# Patient Record
Sex: FEMALE | Race: WHITE | NOT HISPANIC OR LATINO | URBAN - METROPOLITAN AREA
[De-identification: names, ages, dates, MRNs, and addresses within clinical notes are randomized per-mention and may not be internally consistent; named-entity substitution may affect disease eponyms.]

---

## 2024-07-04 ENCOUNTER — INPATIENT (INPATIENT)
Facility: HOSPITAL | Age: 89
LOS: 4 days | Discharge: NURSING, REHAB OR LONG-TERM CARE | End: 2024-07-09
Attending: INTERNAL MEDICINE | Admitting: INTERNAL MEDICINE
Payer: MEDICARE

## 2024-07-04 VITALS
HEIGHT: 62 IN | HEART RATE: 78 BPM | TEMPERATURE: 101 F | DIASTOLIC BLOOD PRESSURE: 50 MMHG | OXYGEN SATURATION: 97 % | SYSTOLIC BLOOD PRESSURE: 143 MMHG | RESPIRATION RATE: 17 BRPM | WEIGHT: 115.08 LBS

## 2024-07-04 LAB
ALBUMIN SERPL ELPH-MCNC: 3.7 G/DL — SIGNIFICANT CHANGE UP (ref 3.3–5)
ALP SERPL-CCNC: 63 U/L — SIGNIFICANT CHANGE UP (ref 40–120)
ALT FLD-CCNC: 25 U/L — SIGNIFICANT CHANGE UP (ref 12–78)
ANION GAP SERPL CALC-SCNC: 8 MMOL/L — SIGNIFICANT CHANGE UP (ref 5–17)
APPEARANCE UR: CLEAR — SIGNIFICANT CHANGE UP
APTT BLD: 26.4 SEC — SIGNIFICANT CHANGE UP (ref 24.5–35.6)
AST SERPL-CCNC: 49 U/L — HIGH (ref 15–37)
BACTERIA # UR AUTO: ABNORMAL /HPF
BASOPHILS # BLD AUTO: 0.04 K/UL — SIGNIFICANT CHANGE UP (ref 0–0.2)
BASOPHILS NFR BLD AUTO: 0.3 % — SIGNIFICANT CHANGE UP (ref 0–2)
BILIRUB SERPL-MCNC: 1.4 MG/DL — HIGH (ref 0.2–1.2)
BILIRUB UR-MCNC: NEGATIVE — SIGNIFICANT CHANGE UP
BUN SERPL-MCNC: 20 MG/DL — SIGNIFICANT CHANGE UP (ref 7–23)
CALCIUM SERPL-MCNC: 10.3 MG/DL — HIGH (ref 8.5–10.1)
CHLORIDE SERPL-SCNC: 101 MMOL/L — SIGNIFICANT CHANGE UP (ref 96–108)
CK SERPL-CCNC: 654 U/L — HIGH (ref 26–192)
CO2 SERPL-SCNC: 28 MMOL/L — SIGNIFICANT CHANGE UP (ref 22–31)
COLOR SPEC: YELLOW — SIGNIFICANT CHANGE UP
CREAT SERPL-MCNC: 1.1 MG/DL — SIGNIFICANT CHANGE UP (ref 0.5–1.3)
DIFF PNL FLD: ABNORMAL
EGFR: 47 ML/MIN/1.73M2 — LOW
EOSINOPHIL # BLD AUTO: 0.06 K/UL — SIGNIFICANT CHANGE UP (ref 0–0.5)
EOSINOPHIL NFR BLD AUTO: 0.4 % — SIGNIFICANT CHANGE UP (ref 0–6)
EPI CELLS # UR: PRESENT
FLUAV AG NPH QL: SIGNIFICANT CHANGE UP
FLUBV AG NPH QL: SIGNIFICANT CHANGE UP
GLUCOSE SERPL-MCNC: 148 MG/DL — HIGH (ref 70–99)
GLUCOSE UR QL: NEGATIVE MG/DL — SIGNIFICANT CHANGE UP
HCT VFR BLD CALC: 38.9 % — SIGNIFICANT CHANGE UP (ref 34.5–45)
HGB BLD-MCNC: 13.1 G/DL — SIGNIFICANT CHANGE UP (ref 11.5–15.5)
IMM GRANULOCYTES NFR BLD AUTO: 0.4 % — SIGNIFICANT CHANGE UP (ref 0–0.9)
INR BLD: 0.97 RATIO — SIGNIFICANT CHANGE UP (ref 0.85–1.18)
KETONES UR-MCNC: ABNORMAL MG/DL
LACTATE SERPL-SCNC: 1.2 MMOL/L — SIGNIFICANT CHANGE UP (ref 0.7–2)
LEUKOCYTE ESTERASE UR-ACNC: ABNORMAL
LYMPHOCYTES # BLD AUTO: 1.07 K/UL — SIGNIFICANT CHANGE UP (ref 1–3.3)
LYMPHOCYTES # BLD AUTO: 8 % — LOW (ref 13–44)
MCHC RBC-ENTMCNC: 30.6 PG — SIGNIFICANT CHANGE UP (ref 27–34)
MCHC RBC-ENTMCNC: 33.7 G/DL — SIGNIFICANT CHANGE UP (ref 32–36)
MCV RBC AUTO: 90.9 FL — SIGNIFICANT CHANGE UP (ref 80–100)
MONOCYTES # BLD AUTO: 0.84 K/UL — SIGNIFICANT CHANGE UP (ref 0–0.9)
MONOCYTES NFR BLD AUTO: 6.3 % — SIGNIFICANT CHANGE UP (ref 2–14)
NEUTROPHILS # BLD AUTO: 11.38 K/UL — HIGH (ref 1.8–7.4)
NEUTROPHILS NFR BLD AUTO: 84.6 % — HIGH (ref 43–77)
NITRITE UR-MCNC: POSITIVE
NRBC # BLD: 0 /100 WBCS — SIGNIFICANT CHANGE UP (ref 0–0)
PH UR: 7.5 — SIGNIFICANT CHANGE UP (ref 5–8)
PLATELET # BLD AUTO: 338 K/UL — SIGNIFICANT CHANGE UP (ref 150–400)
POTASSIUM SERPL-MCNC: 3.6 MMOL/L — SIGNIFICANT CHANGE UP (ref 3.5–5.3)
POTASSIUM SERPL-SCNC: 3.6 MMOL/L — SIGNIFICANT CHANGE UP (ref 3.5–5.3)
PROT SERPL-MCNC: 6.9 GM/DL — SIGNIFICANT CHANGE UP (ref 6–8.3)
PROT UR-MCNC: NEGATIVE MG/DL — SIGNIFICANT CHANGE UP
PROTHROM AB SERPL-ACNC: 11.5 SEC — SIGNIFICANT CHANGE UP (ref 9.5–13)
RBC # BLD: 4.28 M/UL — SIGNIFICANT CHANGE UP (ref 3.8–5.2)
RBC # FLD: 13.9 % — SIGNIFICANT CHANGE UP (ref 10.3–14.5)
RBC CASTS # UR COMP ASSIST: 1 /HPF — SIGNIFICANT CHANGE UP (ref 0–4)
SARS-COV-2 RNA SPEC QL NAA+PROBE: SIGNIFICANT CHANGE UP
SODIUM SERPL-SCNC: 137 MMOL/L — SIGNIFICANT CHANGE UP (ref 135–145)
SP GR SPEC: 1.01 — SIGNIFICANT CHANGE UP (ref 1–1.03)
UROBILINOGEN FLD QL: 0.2 MG/DL — SIGNIFICANT CHANGE UP (ref 0.2–1)
WBC # BLD: 13.44 K/UL — HIGH (ref 3.8–10.5)
WBC # FLD AUTO: 13.44 K/UL — HIGH (ref 3.8–10.5)
WBC UR QL: 3 /HPF — SIGNIFICANT CHANGE UP (ref 0–5)

## 2024-07-04 PROCEDURE — 93010 ELECTROCARDIOGRAM REPORT: CPT

## 2024-07-04 PROCEDURE — 70450 CT HEAD/BRAIN W/O DYE: CPT | Mod: 26,MC

## 2024-07-04 PROCEDURE — 71045 X-RAY EXAM CHEST 1 VIEW: CPT | Mod: 26

## 2024-07-04 PROCEDURE — 72125 CT NECK SPINE W/O DYE: CPT | Mod: 26,MC

## 2024-07-04 PROCEDURE — 99223 1ST HOSP IP/OBS HIGH 75: CPT

## 2024-07-04 PROCEDURE — 73521 X-RAY EXAM HIPS BI 2 VIEWS: CPT | Mod: 26

## 2024-07-04 PROCEDURE — 99285 EMERGENCY DEPT VISIT HI MDM: CPT

## 2024-07-04 RX ORDER — ACETAMINOPHEN 325 MG
1000 TABLET ORAL ONCE
Refills: 0 | Status: COMPLETED | OUTPATIENT
Start: 2024-07-04 | End: 2024-07-04

## 2024-07-04 RX ORDER — CEFTRIAXONE SODIUM 500 MG
1000 VIAL (EA) INJECTION ONCE
Refills: 0 | Status: COMPLETED | OUTPATIENT
Start: 2024-07-04 | End: 2024-07-04

## 2024-07-04 RX ORDER — SODIUM CHLORIDE 0.9 % (FLUSH) 0.9 %
1600 SYRINGE (ML) INJECTION ONCE
Refills: 0 | Status: COMPLETED | OUTPATIENT
Start: 2024-07-04 | End: 2024-07-04

## 2024-07-04 RX ADMIN — Medication 100 MILLIGRAM(S): at 22:14

## 2024-07-04 RX ADMIN — Medication 1000 MILLIGRAM(S): at 23:00

## 2024-07-04 RX ADMIN — Medication 400 MILLIGRAM(S): at 21:35

## 2024-07-04 RX ADMIN — Medication 1000 MILLIGRAM(S): at 22:04

## 2024-07-04 RX ADMIN — Medication 1600 MILLILITER(S): at 21:35

## 2024-07-04 NOTE — ED ADULT NURSE NOTE - CHIEF COMPLAINT QUOTE
son found pt on the living room floor , unknown for how long pt was in the floor , pt is [place] c/o finger pain , pt is more alter than usual , pt fell earlier this morning, redness right elbow and right hip

## 2024-07-04 NOTE — ED PROVIDER NOTE - CLINICAL SUMMARY MEDICAL DECISION MAKING FREE TEXT BOX
89F pmhx htn, hld, anxiety, ocd who presents for fall x 2 , found on floor this morning by son but was able to be helped up and ambulating and eating breakfast, then son came home and found pt on floor again and more lethargic/confused than usual. No recent known illness, vomiting, or cough or sick contacts. Pt typically very independent.  Pt denies complaints on assessment-  -no focal neuro deficits, obtain ct h/n due to unwitnessed fall and poss trauma, cxr, pelvis xr, rectal temp with + fever, rule out sepsis, IV fluids, abx, admission given weakness and confusion in setting of fever.

## 2024-07-04 NOTE — ED ADULT NURSE NOTE - NSFALLHARMRISKINTERV_ED_ALL_ED
Assistance OOB with selected safe patient handling equipment if applicable/Assistance with ambulation/Communicate risk of Fall with Harm to all staff, patient, and family/Monitor gait and stability/Monitor for mental status changes and reorient to person, place, and time, as needed/Provide visual cue: red socks, yellow wristband, yellow gown, etc/Reinforce activity limits and safety measures with patient and family/Toileting schedule using arm’s reach rule for commode and bathroom/Use of alarms - bed, stretcher, chair and/or video monitoring/Bed in lowest position, wheels locked, appropriate side rails in place/Call bell, personal items and telephone in reach/Instruct patient to call for assistance before getting out of bed/chair/stretcher/Non-slip footwear applied when patient is off stretcher/[place] to call system/Physically safe environment - no spills, clutter or unnecessary equipment/Purposeful Proactive Rounding/Room/bathroom lighting operational, light cord in reach

## 2024-07-04 NOTE — ED ADULT NURSE NOTE - ED STAT RN HANDOFF DETAILS
Pt report given to Miguelito Rn. Pt A&OX2. Pt hard of hearing nurse made aware. Pt VSS nad noted at this time. IV intact, safety maintained. all pending orders endorsed to receiving RN at this time.

## 2024-07-04 NOTE — ED ADULT NURSE NOTE - OBJECTIVE STATEMENT
Pt hard of hearing. Pt c/o son found pt on the living room floor, unknown for how long pt was in the floor. As per son pt is more "out of it than usual". Pt c/o back pain, headache and right hip pain with redness and bruising noted in that area. As per son this is second fall today, pt fell earlier this morning, redness right elbow. Pt placed on cardiac monitor. Dr. Anderson at bedside. pt confused at this time with son at bedside.

## 2024-07-04 NOTE — ED ADULT TRIAGE NOTE - CHIEF COMPLAINT QUOTE
son found pt on the living room floor , unknown for how long pt was in the floor , pt is [place] c/o finger pain , pt is more alter than usual , pt fell earlier this morning son found pt on the living room floor , unknown for how long pt was in the floor , pt is [place] c/o finger pain , pt is more alter than usual , pt fell earlier this morning, redness right elbow and right hip

## 2024-07-04 NOTE — ED PROVIDER NOTE - PHYSICAL EXAMINATION
Gen: aox3, nad,   Head: NCAT  ENT: Airway patent, moist mucous membranes, nasal passageways clear, no pharyngeal erythema or exudates, uvula midline, no cervical lymphadenopathy  Cardiac: Normal rate, normal rhythm   Respiratory: Lungs CTA B/L  Gastrointestinal: Abdomen soft, nontender, nondistended, no rebound, no guarding  MSK: No gross abnormalities, FROM of all four extremities, no edema, no midline spinal ttp , no hip ttp, pelvis stable,   HEME: Extremities warm, pulses intact and symmetrical in all four extremities  Skin: No rashes, no lesions, ecchymosis dorsal aspect right hand   Neuro: No gross neurologic deficits, CN II-XII intact, no facial asymmetry, no dysarthria, no dysmetria,   strength equal in all four extremities Gen: aox3, nad,   Head: NCAT  ENT: Airway patent, dry mucous membranes, nasal passageways clear, no pharyngeal erythema or exudates, uvula midline, no cervical lymphadenopathy  Cardiac: Normal rate, normal rhythm   Respiratory: Lungs CTA B/L  Gastrointestinal: Abdomen soft, nontender, nondistended, no rebound, no guarding  MSK: No gross abnormalities, FROM of all four extremities, no edema, no midline spinal ttp , no hip ttp, pelvis stable,   HEME: Extremities warm, pulses intact and symmetrical in all four extremities  Skin: No rashes, no lesions, ecchymosis dorsal aspect right hand   Neuro: No gross neurologic deficits, CN II-XII intact, no facial asymmetry, no dysarthria, no dysmetria,   strength equal in all four extremities

## 2024-07-04 NOTE — ED ADULT NURSE NOTE - NSICDXPASTMEDICALHX_GEN_ALL_CORE_FT
PAST MEDICAL HISTORY:  Anxiety     HLD (hyperlipidemia)     HTN (hypertension)     OCD (obsessive compulsive disorder)

## 2024-07-04 NOTE — ED PROVIDER NOTE - OBJECTIVE STATEMENT
89F pmhx htn, hld, anxiety, ocd who presents for fall x 2 , found on floor this morning by son but was able to be helped up and ambulating and eating breakfast, then son came home and found pt on floor again and more lethargic/confused than usual. No recent known illness, vomiting, or cough or sick contacts. Pt typically very independent.  Pt denies complaints on assessment    Pts son with home med listed as : losartan hctz 50-12.5 , escitalopram 10mg, diazepam 2mg before sleep, atenolol 10mg, hydralazine 10 mg , atorvastatin 20mg nifedipine ER 60mg

## 2024-07-05 DIAGNOSIS — Z29.9 ENCOUNTER FOR PROPHYLACTIC MEASURES, UNSPECIFIED: ICD-10-CM

## 2024-07-05 DIAGNOSIS — F41.9 ANXIETY DISORDER, UNSPECIFIED: ICD-10-CM

## 2024-07-05 DIAGNOSIS — E78.5 HYPERLIPIDEMIA, UNSPECIFIED: ICD-10-CM

## 2024-07-05 DIAGNOSIS — N39.0 URINARY TRACT INFECTION, SITE NOT SPECIFIED: ICD-10-CM

## 2024-07-05 DIAGNOSIS — I10 ESSENTIAL (PRIMARY) HYPERTENSION: ICD-10-CM

## 2024-07-05 LAB
ALBUMIN SERPL ELPH-MCNC: 3.5 G/DL — SIGNIFICANT CHANGE UP (ref 3.3–5)
ALP SERPL-CCNC: 58 U/L — SIGNIFICANT CHANGE UP (ref 40–120)
ALT FLD-CCNC: 25 U/L — SIGNIFICANT CHANGE UP (ref 12–78)
ANION GAP SERPL CALC-SCNC: 8 MMOL/L — SIGNIFICANT CHANGE UP (ref 5–17)
AST SERPL-CCNC: 36 U/L — SIGNIFICANT CHANGE UP (ref 15–37)
BASOPHILS # BLD AUTO: 0.06 K/UL — SIGNIFICANT CHANGE UP (ref 0–0.2)
BASOPHILS NFR BLD AUTO: 0.5 % — SIGNIFICANT CHANGE UP (ref 0–2)
BILIRUB SERPL-MCNC: 1 MG/DL — SIGNIFICANT CHANGE UP (ref 0.2–1.2)
BUN SERPL-MCNC: 14 MG/DL — SIGNIFICANT CHANGE UP (ref 7–23)
CALCIUM SERPL-MCNC: 9.4 MG/DL — SIGNIFICANT CHANGE UP (ref 8.5–10.1)
CHLORIDE SERPL-SCNC: 104 MMOL/L — SIGNIFICANT CHANGE UP (ref 96–108)
CO2 SERPL-SCNC: 27 MMOL/L — SIGNIFICANT CHANGE UP (ref 22–31)
CREAT SERPL-MCNC: 0.83 MG/DL — SIGNIFICANT CHANGE UP (ref 0.5–1.3)
EGFR: 66 ML/MIN/1.73M2 — SIGNIFICANT CHANGE UP
EOSINOPHIL # BLD AUTO: 0.17 K/UL — SIGNIFICANT CHANGE UP (ref 0–0.5)
EOSINOPHIL NFR BLD AUTO: 1.4 % — SIGNIFICANT CHANGE UP (ref 0–6)
GLUCOSE SERPL-MCNC: 141 MG/DL — HIGH (ref 70–99)
HCT VFR BLD CALC: 38.1 % — SIGNIFICANT CHANGE UP (ref 34.5–45)
HGB BLD-MCNC: 12.3 G/DL — SIGNIFICANT CHANGE UP (ref 11.5–15.5)
IMM GRANULOCYTES NFR BLD AUTO: 0.4 % — SIGNIFICANT CHANGE UP (ref 0–0.9)
LYMPHOCYTES # BLD AUTO: 0.99 K/UL — LOW (ref 1–3.3)
LYMPHOCYTES # BLD AUTO: 8.4 % — LOW (ref 13–44)
MCHC RBC-ENTMCNC: 30 PG — SIGNIFICANT CHANGE UP (ref 27–34)
MCHC RBC-ENTMCNC: 32.3 G/DL — SIGNIFICANT CHANGE UP (ref 32–36)
MCV RBC AUTO: 92.9 FL — SIGNIFICANT CHANGE UP (ref 80–100)
MONOCYTES # BLD AUTO: 0.57 K/UL — SIGNIFICANT CHANGE UP (ref 0–0.9)
MONOCYTES NFR BLD AUTO: 4.8 % — SIGNIFICANT CHANGE UP (ref 2–14)
NEUTROPHILS # BLD AUTO: 10 K/UL — HIGH (ref 1.8–7.4)
NEUTROPHILS NFR BLD AUTO: 84.5 % — HIGH (ref 43–77)
NRBC # BLD: 0 /100 WBCS — SIGNIFICANT CHANGE UP (ref 0–0)
PHOSPHATE SERPL-MCNC: 3.3 MG/DL — SIGNIFICANT CHANGE UP (ref 2.5–4.5)
PLATELET # BLD AUTO: 313 K/UL — SIGNIFICANT CHANGE UP (ref 150–400)
POTASSIUM SERPL-MCNC: 2.9 MMOL/L — CRITICAL LOW (ref 3.5–5.3)
POTASSIUM SERPL-SCNC: 2.9 MMOL/L — CRITICAL LOW (ref 3.5–5.3)
PROT SERPL-MCNC: 6.2 GM/DL — SIGNIFICANT CHANGE UP (ref 6–8.3)
RBC # BLD: 4.1 M/UL — SIGNIFICANT CHANGE UP (ref 3.8–5.2)
RBC # FLD: 13.9 % — SIGNIFICANT CHANGE UP (ref 10.3–14.5)
SODIUM SERPL-SCNC: 139 MMOL/L — SIGNIFICANT CHANGE UP (ref 135–145)
WBC # BLD: 11.84 K/UL — HIGH (ref 3.8–10.5)
WBC # FLD AUTO: 11.84 K/UL — HIGH (ref 3.8–10.5)

## 2024-07-05 PROCEDURE — 99232 SBSQ HOSP IP/OBS MODERATE 35: CPT

## 2024-07-05 RX ORDER — MAGNESIUM, ALUMINUM HYDROXIDE 400-400
30 TABLET,CHEWABLE ORAL EVERY 4 HOURS
Refills: 0 | Status: DISCONTINUED | OUTPATIENT
Start: 2024-07-05 | End: 2024-07-09

## 2024-07-05 RX ORDER — KETOROLAC TROMETHAMINE 30 MG/ML
30 INJECTION, SOLUTION INTRAMUSCULAR ONCE
Refills: 0 | Status: DISCONTINUED | OUTPATIENT
Start: 2024-07-05 | End: 2024-07-05

## 2024-07-05 RX ORDER — LOSARTAN POTASSIUM 100 MG/1
50 TABLET, FILM COATED ORAL DAILY
Refills: 0 | Status: DISCONTINUED | OUTPATIENT
Start: 2024-07-05 | End: 2024-07-09

## 2024-07-05 RX ORDER — ACETAMINOPHEN 325 MG
650 TABLET ORAL EVERY 6 HOURS
Refills: 0 | Status: DISCONTINUED | OUTPATIENT
Start: 2024-07-05 | End: 2024-07-09

## 2024-07-05 RX ORDER — HEPARIN SODIUM 50 [USP'U]/ML
5000 INJECTION, SOLUTION INTRAVENOUS EVERY 12 HOURS
Refills: 0 | Status: DISCONTINUED | OUTPATIENT
Start: 2024-07-05 | End: 2024-07-09

## 2024-07-05 RX ORDER — ONDANSETRON HYDROCHLORIDE 2 MG/ML
4 INJECTION INTRAMUSCULAR; INTRAVENOUS EVERY 8 HOURS
Refills: 0 | Status: DISCONTINUED | OUTPATIENT
Start: 2024-07-05 | End: 2024-07-09

## 2024-07-05 RX ORDER — DEXTROSE MONOHYDRATE AND SODIUM CHLORIDE 5; .3 G/100ML; G/100ML
1000 INJECTION, SOLUTION INTRAVENOUS
Refills: 0 | Status: DISCONTINUED | OUTPATIENT
Start: 2024-07-05 | End: 2024-07-09

## 2024-07-05 RX ORDER — DEXTROSE MONOHYDRATE AND SODIUM CHLORIDE 5; .3 G/100ML; G/100ML
1000 INJECTION, SOLUTION INTRAVENOUS
Refills: 0 | Status: DISCONTINUED | OUTPATIENT
Start: 2024-07-05 | End: 2024-07-05

## 2024-07-05 RX ORDER — ESCITALOPRAM OXALATE 20 MG/1
10 TABLET, FILM COATED ORAL DAILY
Refills: 0 | Status: DISCONTINUED | OUTPATIENT
Start: 2024-07-05 | End: 2024-07-09

## 2024-07-05 RX ORDER — LOSARTAN/HYDROCHLOROTHIAZIDE 100MG-25MG
0 TABLET ORAL
Qty: 0 | Refills: 0 | DISCHARGE

## 2024-07-05 RX ORDER — ATENOLOL 50 MG/1
100 TABLET ORAL DAILY
Refills: 0 | Status: DISCONTINUED | OUTPATIENT
Start: 2024-07-05 | End: 2024-07-09

## 2024-07-05 RX ORDER — ATORVASTATIN CALCIUM 20 MG/1
20 TABLET, FILM COATED ORAL AT BEDTIME
Refills: 0 | Status: DISCONTINUED | OUTPATIENT
Start: 2024-07-05 | End: 2024-07-09

## 2024-07-05 RX ORDER — POTASSIUM CHLORIDE 600 MG/1
10 TABLET, FILM COATED, EXTENDED RELEASE ORAL
Refills: 0 | Status: COMPLETED | OUTPATIENT
Start: 2024-07-05 | End: 2024-07-05

## 2024-07-05 RX ORDER — POTASSIUM CHLORIDE 600 MG/1
40 TABLET, FILM COATED, EXTENDED RELEASE ORAL EVERY 4 HOURS
Refills: 0 | Status: DISCONTINUED | OUTPATIENT
Start: 2024-07-05 | End: 2024-07-05

## 2024-07-05 RX ORDER — CEFTRIAXONE SODIUM 500 MG
1000 VIAL (EA) INJECTION EVERY 24 HOURS
Refills: 0 | Status: COMPLETED | OUTPATIENT
Start: 2024-07-05 | End: 2024-07-07

## 2024-07-05 RX ORDER — DIAZEPAM 10 MG/1
2 TABLET ORAL AT BEDTIME
Refills: 0 | Status: DISCONTINUED | OUTPATIENT
Start: 2024-07-05 | End: 2024-07-09

## 2024-07-05 RX ORDER — HYDRALAZINE HYDROCHLORIDE 50 MG/1
10 TABLET ORAL THREE TIMES A DAY
Refills: 0 | Status: DISCONTINUED | OUTPATIENT
Start: 2024-07-05 | End: 2024-07-09

## 2024-07-05 RX ADMIN — DEXTROSE MONOHYDRATE AND SODIUM CHLORIDE 125 MILLILITER(S): 5; .3 INJECTION, SOLUTION INTRAVENOUS at 19:46

## 2024-07-05 RX ADMIN — HEPARIN SODIUM 5000 UNIT(S): 50 INJECTION, SOLUTION INTRAVENOUS at 18:12

## 2024-07-05 RX ADMIN — KETOROLAC TROMETHAMINE 30 MILLIGRAM(S): 30 INJECTION, SOLUTION INTRAMUSCULAR at 18:15

## 2024-07-05 RX ADMIN — HEPARIN SODIUM 5000 UNIT(S): 50 INJECTION, SOLUTION INTRAVENOUS at 05:49

## 2024-07-05 RX ADMIN — POTASSIUM CHLORIDE 100 MILLIEQUIVALENT(S): 600 TABLET, FILM COATED, EXTENDED RELEASE ORAL at 11:29

## 2024-07-05 RX ADMIN — POTASSIUM CHLORIDE 100 MILLIEQUIVALENT(S): 600 TABLET, FILM COATED, EXTENDED RELEASE ORAL at 10:27

## 2024-07-05 RX ADMIN — LOSARTAN POTASSIUM 50 MILLIGRAM(S): 100 TABLET, FILM COATED ORAL at 05:49

## 2024-07-05 RX ADMIN — POTASSIUM CHLORIDE 100 MILLIEQUIVALENT(S): 600 TABLET, FILM COATED, EXTENDED RELEASE ORAL at 09:19

## 2024-07-05 RX ADMIN — DEXTROSE MONOHYDRATE AND SODIUM CHLORIDE 100 MILLILITER(S): 5; .3 INJECTION, SOLUTION INTRAVENOUS at 07:07

## 2024-07-05 RX ADMIN — Medication 100 MILLIGRAM(S): at 21:26

## 2024-07-05 RX ADMIN — KETOROLAC TROMETHAMINE 30 MILLIGRAM(S): 30 INJECTION, SOLUTION INTRAMUSCULAR at 19:46

## 2024-07-05 RX ADMIN — HYDRALAZINE HYDROCHLORIDE 10 MILLIGRAM(S): 50 TABLET ORAL at 05:50

## 2024-07-05 RX ADMIN — ATENOLOL 100 MILLIGRAM(S): 50 TABLET ORAL at 05:49

## 2024-07-05 RX ADMIN — Medication 60 MILLIGRAM(S): at 05:49

## 2024-07-05 RX ADMIN — DEXTROSE MONOHYDRATE AND SODIUM CHLORIDE 100 MILLILITER(S): 5; .3 INJECTION, SOLUTION INTRAVENOUS at 03:20

## 2024-07-05 RX ADMIN — ATORVASTATIN CALCIUM 20 MILLIGRAM(S): 20 TABLET, FILM COATED ORAL at 21:26

## 2024-07-05 NOTE — H&P ADULT - PROBLEM SELECTOR PLAN 5
DVT ppx: HepsubQ  Fall and aspiration precautions.  General diet.   Turn and reposition q2h to prevent bedsores  Skin checks as per RN

## 2024-07-05 NOTE — H&P ADULT - HISTORY OF PRESENT ILLNESS
89F w/ PMHx of HTN, HLD, Anxiety, OCD presents with c/o fall x2. Patient poor historian, history as per chart review. Patient was found on floor yesterday morning by son but was able to be helped up and ambulating and eating breakfast, then son came home and found pt on floor again and more lethargic/confused than usual. No recent known illness, vomiting, or cough or sick contacts. Pt typically very independent. In ED patient with Tmax 101.2, vitals otherwise stable. CTH neg for intracranial pathology. Admitted for UTI.

## 2024-07-05 NOTE — H&P ADULT - NSHPLABSRESULTS_GEN_ALL_CORE
LABS:  07-04 @ 21:10 Creatine 654 U/L<H> [26 - 192]  cret                        13.1   13.44 )-----------( 338      ( 04 Jul 2024 21:10 )             38.9     07-04    137  |  101  |  20  ----------------------------<  148<H>  3.6   |  28  |  1.10    Ca    10.3<H>      04 Jul 2024 21:10    TPro  6.9  /  Alb  3.7  /  TBili  1.4<H>  /  DBili  x   /  AST  49<H>  /  ALT  25  /  AlkPhos  63  07-04    PT/INR - ( 04 Jul 2024 22:10 )   PT: 11.5 sec;   INR: 0.97 ratio         PTT - ( 04 Jul 2024 22:10 )  PTT:26.4 sec    < from: CT Cervical Spine No Cont (07.04.24 @ 22:11) >    IMPRESSION:  CT head: No evidence of intracranial injury or skullfracture.    CT cervical spine: C5, C6, and C7 are markedly degraded by motion   artifact and fracture at these levels cannot be excluded. Recommend   repeat CT cervical spine when the patient is able to tolerate.    --- End of Report ---    < end of copied text >

## 2024-07-05 NOTE — H&P ADULT - NSHPPHYSICALEXAM_GEN_ALL_CORE
T(C): 36.6 (07-04-24 @ 23:57), Max: 38.4 (07-04-24 @ 20:47)  HR: 74 (07-04-24 @ 23:57) (74 - 79)  BP: 128/63 (07-04-24 @ 23:57) (128/63 - 152/53)  RR: 24 (07-04-24 @ 23:57) (17 - 24)  SpO2: 95% (07-04-24 @ 23:57) (95% - 97%)    General: non-toxic, Frail  HEENT: non-traumatic, perrla, eomi  Cardio: s1s2 regular rate and rhythm  Lungs: comfortable breathing, clear to auscultation  Abdomen: Soft, non-tender, non-distended  Neuro: AOx1 to self.   Ext: Pulses +2

## 2024-07-06 LAB
CULTURE RESULTS: SIGNIFICANT CHANGE UP
SPECIMEN SOURCE: SIGNIFICANT CHANGE UP

## 2024-07-06 PROCEDURE — 99232 SBSQ HOSP IP/OBS MODERATE 35: CPT

## 2024-07-06 RX ORDER — HALOPERIDOL DECANOATE 100 MG/ML
2 VIAL (ML) INTRAMUSCULAR ONCE
Refills: 0 | Status: COMPLETED | OUTPATIENT
Start: 2024-07-06 | End: 2024-07-06

## 2024-07-06 RX ORDER — POTASSIUM CHLORIDE 600 MG/1
10 TABLET, FILM COATED, EXTENDED RELEASE ORAL
Refills: 0 | Status: COMPLETED | OUTPATIENT
Start: 2024-07-06 | End: 2024-07-06

## 2024-07-06 RX ORDER — DIAZEPAM 10 MG/1
2 TABLET ORAL ONCE
Refills: 0 | Status: DISCONTINUED | OUTPATIENT
Start: 2024-07-06 | End: 2024-07-06

## 2024-07-06 RX ADMIN — POTASSIUM CHLORIDE 100 MILLIEQUIVALENT(S): 600 TABLET, FILM COATED, EXTENDED RELEASE ORAL at 20:44

## 2024-07-06 RX ADMIN — HEPARIN SODIUM 5000 UNIT(S): 50 INJECTION, SOLUTION INTRAVENOUS at 17:31

## 2024-07-06 RX ADMIN — HYDRALAZINE HYDROCHLORIDE 10 MILLIGRAM(S): 50 TABLET ORAL at 21:51

## 2024-07-06 RX ADMIN — Medication 2 MILLIGRAM(S): at 07:12

## 2024-07-06 RX ADMIN — Medication 100 MILLIGRAM(S): at 21:50

## 2024-07-06 RX ADMIN — DEXTROSE MONOHYDRATE AND SODIUM CHLORIDE 125 MILLILITER(S): 5; .3 INJECTION, SOLUTION INTRAVENOUS at 05:03

## 2024-07-06 RX ADMIN — HEPARIN SODIUM 5000 UNIT(S): 50 INJECTION, SOLUTION INTRAVENOUS at 05:02

## 2024-07-06 RX ADMIN — ATORVASTATIN CALCIUM 20 MILLIGRAM(S): 20 TABLET, FILM COATED ORAL at 21:50

## 2024-07-06 RX ADMIN — DIAZEPAM 2 MILLIGRAM(S): 10 TABLET ORAL at 21:50

## 2024-07-06 RX ADMIN — POTASSIUM CHLORIDE 100 MILLIEQUIVALENT(S): 600 TABLET, FILM COATED, EXTENDED RELEASE ORAL at 18:42

## 2024-07-06 RX ADMIN — DIAZEPAM 2 MILLIGRAM(S): 10 TABLET ORAL at 00:23

## 2024-07-06 RX ADMIN — DEXTROSE MONOHYDRATE AND SODIUM CHLORIDE 125 MILLILITER(S): 5; .3 INJECTION, SOLUTION INTRAVENOUS at 15:19

## 2024-07-06 RX ADMIN — ESCITALOPRAM OXALATE 10 MILLIGRAM(S): 20 TABLET, FILM COATED ORAL at 11:52

## 2024-07-06 RX ADMIN — POTASSIUM CHLORIDE 100 MILLIEQUIVALENT(S): 600 TABLET, FILM COATED, EXTENDED RELEASE ORAL at 19:41

## 2024-07-06 NOTE — DIETITIAN INITIAL EVALUATION ADULT - OTHER INFO
Pt  seen on medical floor, adm w/ UTI, lethargy & Confusion ; HTN ; Anxiety; OCD ; Alert / confused. 1:1 at bedside for safety. No reports of  N/V/D/C/Chewing/Swallowing issues, No food allergies  Pt  seen on medical floor, adm w/ UTI, lethargy & Confusion ; HTN ; Anxiety; OCD ; Alert / confused. 1:1 at bedside for safety. No reports of  N/V/D/C/Chewing/Swallowing issues, No food allergies . Notified by RN that the patient has became restless, climbing out of bed, pulling at lines, and physically aggressive towards staff. At this time she is hemodynamically stable therefore she was ordered for 2mg of Haldol IV.  Pt asleep at time of visit.

## 2024-07-06 NOTE — PHYSICAL THERAPY INITIAL EVALUATION ADULT - ADDITIONAL COMMENTS
as per Son Claude, his mother was living on a  with 4 stairs to enter with R HR to access main level. pt was independent. no driving.

## 2024-07-06 NOTE — PHYSICAL THERAPY INITIAL EVALUATION ADULT - GAIT TRAINING, PT EVAL
To be able to perform ambulation independently using cane for 200 feet, using proper technique using AD, with proper posture and functional distance at home in 4 weeks.

## 2024-07-06 NOTE — DIETITIAN INITIAL EVALUATION ADULT - DIET TYPE
Ensure Plus High Protein x 2/day (provides 700 kcal, 40 g protein)/regular/colostomy/supplement (specify) Ensure Plus High Protein x 2/day (provides 700 kcal, 40 g protein)/regular/supplement (specify)

## 2024-07-06 NOTE — DIETITIAN INITIAL EVALUATION ADULT - PERTINENT LABORATORY DATA
07-05    139  |  104  |  14  ----------------------------<  141<H>  2.9<LL>   |  27  |  0.83    Ca    9.4      05 Jul 2024 07:15  Phos  3.3     07-05    TPro  6.2  /  Alb  3.5  /  TBili  1.0  /  DBili  x   /  AST  36  /  ALT  25  /  AlkPhos  58  07-05   stated

## 2024-07-06 NOTE — PHYSICAL THERAPY INITIAL EVALUATION ADULT - PERTINENT HX OF CURRENT PROBLEM, REHAB EVAL
This is the hx of VANESA.  a 89 y/o female patient who was admitted to [facility] due to complications of Acute UTI affecting medical condition and with subsequent affection on functional mobility.

## 2024-07-06 NOTE — PHYSICAL THERAPY INITIAL EVALUATION ADULT - GENERAL OBSERVATIONS, REHAB EVAL
Chart reviewed, pt encountered on supine, AxOx1, as per nurse agitated this morning, + IV intact, + primafit, cardiac monitor, Son Wes at bedside.

## 2024-07-06 NOTE — CHART NOTE - NSCHARTNOTEFT_GEN_A_CORE
Internal medicine PA Chart Note    HPI:  89F w/ PMHx of HTN, HLD, Anxiety, OCD presents with c/o fall x2. Patient poor historian, history as per chart review. Patient was found on floor yesterday morning by son but was able to be helped up and ambulating and eating breakfast, then son came home and found pt on floor again and more lethargic/confused than usual. No recent known illness, vomiting, or cough or sick contacts. Pt typically very independent. In ED patient with Tmax 101.2, vitals otherwise stable. CTH neg for intracranial pathology. Admitted for UTI.  (05 Jul 2024 01:07)      Notified by RN that the patient has became restless, climbing out of bed, pulling at lines, and physically aggressive towards staff. At this time she is hemodynamically stable therefore she was ordered for 2mg of Haldol IV.

## 2024-07-06 NOTE — DIETITIAN INITIAL EVALUATION ADULT - PERTINENT MEDS FT
MEDICATIONS  (STANDING):  atenolol  Tablet 100 milliGRAM(s) Oral daily  atorvastatin 20 milliGRAM(s) Oral at bedtime  cefTRIAXone   IVPB 1000 milliGRAM(s) IV Intermittent every 24 hours  diazepam    Tablet 2 milliGRAM(s) Oral at bedtime  escitalopram 10 milliGRAM(s) Oral daily  heparin   Injectable 5000 Unit(s) SubCutaneous every 12 hours  hydrALAZINE 10 milliGRAM(s) Oral three times a day  hydrochlorothiazide 12.5 milliGRAM(s) Oral daily  lactated ringers. 1000 milliLiter(s) (125 mL/Hr) IV Continuous <Continuous>  losartan 50 milliGRAM(s) Oral daily  NIFEdipine XL 60 milliGRAM(s) Oral daily    MEDICATIONS  (PRN):  acetaminophen     Tablet .. 650 milliGRAM(s) Oral every 6 hours PRN Temp greater or equal to 38C (100.4F), Mild Pain (1 - 3)  aluminum hydroxide/magnesium hydroxide/simethicone Suspension 30 milliLiter(s) Oral every 4 hours PRN Dyspepsia  melatonin 3 milliGRAM(s) Oral at bedtime PRN Insomnia  ondansetron Injectable 4 milliGRAM(s) IV Push every 8 hours PRN Nausea and/or Vomiting

## 2024-07-07 LAB
ANION GAP SERPL CALC-SCNC: 11 MMOL/L — SIGNIFICANT CHANGE UP (ref 5–17)
BUN SERPL-MCNC: 12 MG/DL — SIGNIFICANT CHANGE UP (ref 7–23)
CALCIUM SERPL-MCNC: 9.1 MG/DL — SIGNIFICANT CHANGE UP (ref 8.5–10.1)
CHLORIDE SERPL-SCNC: 105 MMOL/L — SIGNIFICANT CHANGE UP (ref 96–108)
CO2 SERPL-SCNC: 22 MMOL/L — SIGNIFICANT CHANGE UP (ref 22–31)
CREAT SERPL-MCNC: 0.76 MG/DL — SIGNIFICANT CHANGE UP (ref 0.5–1.3)
EGFR: 73 ML/MIN/1.73M2 — SIGNIFICANT CHANGE UP
GLUCOSE BLDC GLUCOMTR-MCNC: 156 MG/DL — HIGH (ref 70–99)
GLUCOSE SERPL-MCNC: 170 MG/DL — HIGH (ref 70–99)
HCT VFR BLD CALC: 38.3 % — SIGNIFICANT CHANGE UP (ref 34.5–45)
HGB BLD-MCNC: 12.7 G/DL — SIGNIFICANT CHANGE UP (ref 11.5–15.5)
MCHC RBC-ENTMCNC: 30.1 PG — SIGNIFICANT CHANGE UP (ref 27–34)
MCHC RBC-ENTMCNC: 33.2 G/DL — SIGNIFICANT CHANGE UP (ref 32–36)
MCV RBC AUTO: 90.8 FL — SIGNIFICANT CHANGE UP (ref 80–100)
NRBC # BLD: 0 /100 WBCS — SIGNIFICANT CHANGE UP (ref 0–0)
PLATELET # BLD AUTO: 334 K/UL — SIGNIFICANT CHANGE UP (ref 150–400)
POTASSIUM SERPL-MCNC: 3.3 MMOL/L — LOW (ref 3.5–5.3)
POTASSIUM SERPL-SCNC: 3.3 MMOL/L — LOW (ref 3.5–5.3)
RBC # BLD: 4.22 M/UL — SIGNIFICANT CHANGE UP (ref 3.8–5.2)
RBC # FLD: 14.1 % — SIGNIFICANT CHANGE UP (ref 10.3–14.5)
SODIUM SERPL-SCNC: 138 MMOL/L — SIGNIFICANT CHANGE UP (ref 135–145)
WBC # BLD: 11.66 K/UL — HIGH (ref 3.8–10.5)
WBC # FLD AUTO: 11.66 K/UL — HIGH (ref 3.8–10.5)

## 2024-07-07 PROCEDURE — 93010 ELECTROCARDIOGRAM REPORT: CPT

## 2024-07-07 PROCEDURE — 99232 SBSQ HOSP IP/OBS MODERATE 35: CPT

## 2024-07-07 RX ORDER — OLANZAPINE 2.5 MG/1
2.5 TABLET, FILM COATED ORAL AT BEDTIME
Refills: 0 | Status: DISCONTINUED | OUTPATIENT
Start: 2024-07-07 | End: 2024-07-09

## 2024-07-07 RX ORDER — LORAZEPAM 0.5 MG
1 TABLET ORAL ONCE
Refills: 0 | Status: DISCONTINUED | OUTPATIENT
Start: 2024-07-07 | End: 2024-07-08

## 2024-07-07 RX ORDER — POTASSIUM CHLORIDE 600 MG/1
40 TABLET, FILM COATED, EXTENDED RELEASE ORAL ONCE
Refills: 0 | Status: COMPLETED | OUTPATIENT
Start: 2024-07-07 | End: 2024-07-07

## 2024-07-07 RX ADMIN — Medication 60 MILLIGRAM(S): at 11:14

## 2024-07-07 RX ADMIN — DEXTROSE MONOHYDRATE AND SODIUM CHLORIDE 125 MILLILITER(S): 5; .3 INJECTION, SOLUTION INTRAVENOUS at 13:56

## 2024-07-07 RX ADMIN — HEPARIN SODIUM 5000 UNIT(S): 50 INJECTION, SOLUTION INTRAVENOUS at 06:37

## 2024-07-07 RX ADMIN — HEPARIN SODIUM 5000 UNIT(S): 50 INJECTION, SOLUTION INTRAVENOUS at 17:08

## 2024-07-07 RX ADMIN — LOSARTAN POTASSIUM 50 MILLIGRAM(S): 100 TABLET, FILM COATED ORAL at 11:15

## 2024-07-07 RX ADMIN — POTASSIUM CHLORIDE 40 MILLIEQUIVALENT(S): 600 TABLET, FILM COATED, EXTENDED RELEASE ORAL at 16:47

## 2024-07-07 RX ADMIN — ATENOLOL 100 MILLIGRAM(S): 50 TABLET ORAL at 11:14

## 2024-07-07 RX ADMIN — DEXTROSE MONOHYDRATE AND SODIUM CHLORIDE 125 MILLILITER(S): 5; .3 INJECTION, SOLUTION INTRAVENOUS at 06:38

## 2024-07-07 RX ADMIN — ATORVASTATIN CALCIUM 20 MILLIGRAM(S): 20 TABLET, FILM COATED ORAL at 22:08

## 2024-07-07 RX ADMIN — HYDRALAZINE HYDROCHLORIDE 10 MILLIGRAM(S): 50 TABLET ORAL at 16:46

## 2024-07-07 RX ADMIN — DEXTROSE MONOHYDRATE AND SODIUM CHLORIDE 125 MILLILITER(S): 5; .3 INJECTION, SOLUTION INTRAVENOUS at 22:08

## 2024-07-07 RX ADMIN — Medication 100 MILLIGRAM(S): at 22:08

## 2024-07-07 RX ADMIN — OLANZAPINE 2.5 MILLIGRAM(S): 2.5 TABLET, FILM COATED ORAL at 22:35

## 2024-07-07 RX ADMIN — DIAZEPAM 2 MILLIGRAM(S): 10 TABLET ORAL at 22:08

## 2024-07-07 RX ADMIN — HYDRALAZINE HYDROCHLORIDE 10 MILLIGRAM(S): 50 TABLET ORAL at 22:08

## 2024-07-07 RX ADMIN — ESCITALOPRAM OXALATE 10 MILLIGRAM(S): 20 TABLET, FILM COATED ORAL at 11:15

## 2024-07-07 NOTE — PHARMACOTHERAPY INTERVENTION NOTE - COMMENTS
Modified allergy header to state patient received ceftriaxone during this admission with no documented reaction.
olanzapine 2.5mg hs. ordered. Patient is 89 yrs old. Suggested getting a QT level. Md agreed

## 2024-07-08 LAB
ANION GAP SERPL CALC-SCNC: 5 MMOL/L — SIGNIFICANT CHANGE UP (ref 5–17)
BUN SERPL-MCNC: 11 MG/DL — SIGNIFICANT CHANGE UP (ref 7–23)
CALCIUM SERPL-MCNC: 8.9 MG/DL — SIGNIFICANT CHANGE UP (ref 8.5–10.1)
CHLORIDE SERPL-SCNC: 107 MMOL/L — SIGNIFICANT CHANGE UP (ref 96–108)
CO2 SERPL-SCNC: 29 MMOL/L — SIGNIFICANT CHANGE UP (ref 22–31)
CREAT SERPL-MCNC: 0.76 MG/DL — SIGNIFICANT CHANGE UP (ref 0.5–1.3)
EGFR: 73 ML/MIN/1.73M2 — SIGNIFICANT CHANGE UP
GLUCOSE SERPL-MCNC: 168 MG/DL — HIGH (ref 70–99)
HCT VFR BLD CALC: 36.5 % — SIGNIFICANT CHANGE UP (ref 34.5–45)
HGB BLD-MCNC: 12.1 G/DL — SIGNIFICANT CHANGE UP (ref 11.5–15.5)
MCHC RBC-ENTMCNC: 30.6 PG — SIGNIFICANT CHANGE UP (ref 27–34)
MCHC RBC-ENTMCNC: 33.2 G/DL — SIGNIFICANT CHANGE UP (ref 32–36)
MCV RBC AUTO: 92.2 FL — SIGNIFICANT CHANGE UP (ref 80–100)
NRBC # BLD: 0 /100 WBCS — SIGNIFICANT CHANGE UP (ref 0–0)
PLATELET # BLD AUTO: 316 K/UL — SIGNIFICANT CHANGE UP (ref 150–400)
POTASSIUM SERPL-MCNC: 3.7 MMOL/L — SIGNIFICANT CHANGE UP (ref 3.5–5.3)
POTASSIUM SERPL-SCNC: 3.7 MMOL/L — SIGNIFICANT CHANGE UP (ref 3.5–5.3)
RBC # BLD: 3.96 M/UL — SIGNIFICANT CHANGE UP (ref 3.8–5.2)
RBC # FLD: 14 % — SIGNIFICANT CHANGE UP (ref 10.3–14.5)
SODIUM SERPL-SCNC: 141 MMOL/L — SIGNIFICANT CHANGE UP (ref 135–145)
WBC # BLD: 11.16 K/UL — HIGH (ref 3.8–10.5)
WBC # FLD AUTO: 11.16 K/UL — HIGH (ref 3.8–10.5)

## 2024-07-08 PROCEDURE — 99232 SBSQ HOSP IP/OBS MODERATE 35: CPT

## 2024-07-08 RX ADMIN — DEXTROSE MONOHYDRATE AND SODIUM CHLORIDE 125 MILLILITER(S): 5; .3 INJECTION, SOLUTION INTRAVENOUS at 06:03

## 2024-07-08 RX ADMIN — DEXTROSE MONOHYDRATE AND SODIUM CHLORIDE 125 MILLILITER(S): 5; .3 INJECTION, SOLUTION INTRAVENOUS at 14:25

## 2024-07-08 RX ADMIN — ATENOLOL 100 MILLIGRAM(S): 50 TABLET ORAL at 06:04

## 2024-07-08 RX ADMIN — LOSARTAN POTASSIUM 50 MILLIGRAM(S): 100 TABLET, FILM COATED ORAL at 06:04

## 2024-07-08 RX ADMIN — Medication 1 MILLIGRAM(S): at 10:49

## 2024-07-08 RX ADMIN — HYDRALAZINE HYDROCHLORIDE 10 MILLIGRAM(S): 50 TABLET ORAL at 06:04

## 2024-07-08 RX ADMIN — HEPARIN SODIUM 5000 UNIT(S): 50 INJECTION, SOLUTION INTRAVENOUS at 06:03

## 2024-07-08 RX ADMIN — DEXTROSE MONOHYDRATE AND SODIUM CHLORIDE 125 MILLILITER(S): 5; .3 INJECTION, SOLUTION INTRAVENOUS at 17:24

## 2024-07-08 RX ADMIN — Medication 60 MILLIGRAM(S): at 06:04

## 2024-07-08 RX ADMIN — HEPARIN SODIUM 5000 UNIT(S): 50 INJECTION, SOLUTION INTRAVENOUS at 17:24

## 2024-07-08 RX ADMIN — DEXTROSE MONOHYDRATE AND SODIUM CHLORIDE 125 MILLILITER(S): 5; .3 INJECTION, SOLUTION INTRAVENOUS at 08:50

## 2024-07-09 ENCOUNTER — TRANSCRIPTION ENCOUNTER (OUTPATIENT)
Age: 89
End: 2024-07-09

## 2024-07-09 VITALS
RESPIRATION RATE: 18 BRPM | SYSTOLIC BLOOD PRESSURE: 162 MMHG | OXYGEN SATURATION: 96 % | TEMPERATURE: 98 F | HEART RATE: 74 BPM | DIASTOLIC BLOOD PRESSURE: 74 MMHG

## 2024-07-09 LAB
ANION GAP SERPL CALC-SCNC: 11 MMOL/L — SIGNIFICANT CHANGE UP (ref 5–17)
BUN SERPL-MCNC: 11 MG/DL — SIGNIFICANT CHANGE UP (ref 7–23)
CALCIUM SERPL-MCNC: 9 MG/DL — SIGNIFICANT CHANGE UP (ref 8.5–10.1)
CHLORIDE SERPL-SCNC: 102 MMOL/L — SIGNIFICANT CHANGE UP (ref 96–108)
CO2 SERPL-SCNC: 24 MMOL/L — SIGNIFICANT CHANGE UP (ref 22–31)
CREAT SERPL-MCNC: 0.71 MG/DL — SIGNIFICANT CHANGE UP (ref 0.5–1.3)
EGFR: 79 ML/MIN/1.73M2 — SIGNIFICANT CHANGE UP
GLUCOSE SERPL-MCNC: 198 MG/DL — HIGH (ref 70–99)
HCT VFR BLD CALC: 37 % — SIGNIFICANT CHANGE UP (ref 34.5–45)
HGB BLD-MCNC: 12.5 G/DL — SIGNIFICANT CHANGE UP (ref 11.5–15.5)
MAGNESIUM RBC-MCNC: 4.2 MG/DL — SIGNIFICANT CHANGE UP (ref 3.7–7)
MCHC RBC-ENTMCNC: 30.3 PG — SIGNIFICANT CHANGE UP (ref 27–34)
MCHC RBC-ENTMCNC: 33.8 G/DL — SIGNIFICANT CHANGE UP (ref 32–36)
MCV RBC AUTO: 89.8 FL — SIGNIFICANT CHANGE UP (ref 80–100)
NRBC # BLD: 0 /100 WBCS — SIGNIFICANT CHANGE UP (ref 0–0)
PLATELET # BLD AUTO: 346 K/UL — SIGNIFICANT CHANGE UP (ref 150–400)
POTASSIUM SERPL-MCNC: 3.5 MMOL/L — SIGNIFICANT CHANGE UP (ref 3.5–5.3)
POTASSIUM SERPL-SCNC: 3.5 MMOL/L — SIGNIFICANT CHANGE UP (ref 3.5–5.3)
RBC # BLD: 4.12 M/UL — SIGNIFICANT CHANGE UP (ref 3.8–5.2)
RBC # FLD: 13.9 % — SIGNIFICANT CHANGE UP (ref 10.3–14.5)
SODIUM SERPL-SCNC: 137 MMOL/L — SIGNIFICANT CHANGE UP (ref 135–145)
WBC # BLD: 14.3 K/UL — HIGH (ref 3.8–10.5)
WBC # FLD AUTO: 14.3 K/UL — HIGH (ref 3.8–10.5)

## 2024-07-09 PROCEDURE — 99239 HOSP IP/OBS DSCHRG MGMT >30: CPT

## 2024-07-09 RX ORDER — DIAZEPAM 10 MG/1
0 TABLET ORAL
Qty: 0 | Refills: 0 | DISCHARGE

## 2024-07-09 RX ORDER — ATENOLOL 50 MG/1
1 TABLET ORAL
Qty: 0 | Refills: 0 | DISCHARGE
Start: 2024-07-09

## 2024-07-09 RX ORDER — ATORVASTATIN CALCIUM 20 MG/1
1 TABLET, FILM COATED ORAL
Qty: 0 | Refills: 0 | DISCHARGE
Start: 2024-07-09

## 2024-07-09 RX ORDER — HYDRALAZINE HYDROCHLORIDE 50 MG/1
0 TABLET ORAL
Qty: 0 | Refills: 0 | DISCHARGE

## 2024-07-09 RX ORDER — NEOMYCIN/POLYMYXIN B/DEXAMETHA 3.5-10K-.1
0 OINTMENT (GRAM) OPHTHALMIC (EYE)
Qty: 0 | Refills: 0 | DISCHARGE

## 2024-07-09 RX ORDER — ATENOLOL 50 MG/1
0 TABLET ORAL
Qty: 0 | Refills: 0 | DISCHARGE

## 2024-07-09 RX ORDER — ESCITALOPRAM OXALATE 20 MG/1
1 TABLET, FILM COATED ORAL
Qty: 0 | Refills: 0 | DISCHARGE
Start: 2024-07-09

## 2024-07-09 RX ORDER — ESCITALOPRAM OXALATE 20 MG/1
0 TABLET, FILM COATED ORAL
Qty: 0 | Refills: 0 | DISCHARGE

## 2024-07-09 RX ORDER — ATORVASTATIN CALCIUM 20 MG/1
0 TABLET, FILM COATED ORAL
Qty: 0 | Refills: 0 | DISCHARGE

## 2024-07-09 RX ADMIN — HYDRALAZINE HYDROCHLORIDE 10 MILLIGRAM(S): 50 TABLET ORAL at 05:11

## 2024-07-09 RX ADMIN — DEXTROSE MONOHYDRATE AND SODIUM CHLORIDE 125 MILLILITER(S): 5; .3 INJECTION, SOLUTION INTRAVENOUS at 08:48

## 2024-07-09 RX ADMIN — HYDRALAZINE HYDROCHLORIDE 10 MILLIGRAM(S): 50 TABLET ORAL at 14:52

## 2024-07-09 RX ADMIN — Medication 60 MILLIGRAM(S): at 05:11

## 2024-07-09 RX ADMIN — HEPARIN SODIUM 5000 UNIT(S): 50 INJECTION, SOLUTION INTRAVENOUS at 05:20

## 2024-07-09 RX ADMIN — ESCITALOPRAM OXALATE 10 MILLIGRAM(S): 20 TABLET, FILM COATED ORAL at 11:30

## 2024-07-09 RX ADMIN — LOSARTAN POTASSIUM 50 MILLIGRAM(S): 100 TABLET, FILM COATED ORAL at 05:12

## 2024-07-09 RX ADMIN — ATENOLOL 100 MILLIGRAM(S): 50 TABLET ORAL at 05:12

## 2024-07-09 NOTE — PROGRESS NOTE ADULT - SUBJECTIVE AND OBJECTIVE BOX
Patient: TERESA LOMBARDO [id number] 89y Female                            Hospitalist Attending Note    Son at bedside reports pt's mental status has improved since admission, not yet at baseline.  much improved, near baseline.  No weakness / numbness.        ____________________PHYSICAL EXAM:  GENERAL:  NAD, Arousable, oriented to person, place "hospital".  Forgetful  HEENT: NCAT  CARDIOVASCULAR:  S1, S2  LUNGS: CTAB  ABDOMEN:  soft, (-) tenderness, (-) distension, (+) bowel sounds, (-) guarding, (-) rebound (-) rigidity  EXTREMITIES:  no cyanosis / clubbing / edema.   NEURO: strength symmetric, sensation grossly intact.   ____________________      VITALS:  Vital Signs Last 24 Hrs  T(C): 37.3 (09 Jul 2024 11:17), Max: 37.3 (09 Jul 2024 11:17)  T(F): 99.2 (09 Jul 2024 11:17), Max: 99.2 (09 Jul 2024 11:17)  HR: 87 (09 Jul 2024 11:17) (72 - 111)  BP: 144/68 (09 Jul 2024 11:17) (136/63 - 177/65)  BP(mean): --  RR: 18 (09 Jul 2024 11:17) (18 - 18)  SpO2: 94% (09 Jul 2024 11:17) (94% - 96%)    Parameters below as of 09 Jul 2024 11:17  Patient On (Oxygen Delivery Method): room air     Daily     Daily   CAPILLARY BLOOD GLUCOSE        I&O's Summary    08 Jul 2024 07:01  -  09 Jul 2024 07:00  --------------------------------------------------------  IN: 1500 mL / OUT: 1400 mL / NET: 100 mL        LABS:                        12.5   14.30 )-----------( 346      ( 09 Jul 2024 07:15 )             37.0     07-09    137  |  102  |  11  ----------------------------<  198<H>  3.5   |  24  |  0.71    Ca    9.0      09 Jul 2024 07:15          Urinalysis Basic - ( 09 Jul 2024 07:15 )    Color: x / Appearance: x / SG: x / pH: x  Gluc: 198 mg/dL / Ketone: x  / Bili: x / Urobili: x   Blood: x / Protein: x / Nitrite: x   Leuk Esterase: x / RBC: x / WBC x   Sq Epi: x / Non Sq Epi: x / Bacteria: x              MEDICATIONS:  acetaminophen     Tablet .. 650 milliGRAM(s) Oral every 6 hours PRN  aluminum hydroxide/magnesium hydroxide/simethicone Suspension 30 milliLiter(s) Oral every 4 hours PRN  atenolol  Tablet 100 milliGRAM(s) Oral daily  atorvastatin 20 milliGRAM(s) Oral at bedtime  escitalopram 10 milliGRAM(s) Oral daily  heparin   Injectable 5000 Unit(s) SubCutaneous every 12 hours  hydrALAZINE 10 milliGRAM(s) Oral three times a day  hydrochlorothiazide 12.5 milliGRAM(s) Oral daily  lactated ringers. 1000 milliLiter(s) IV Continuous <Continuous>  losartan 50 milliGRAM(s) Oral daily  melatonin 3 milliGRAM(s) Oral at bedtime PRN  NIFEdipine XL 60 milliGRAM(s) Oral daily  ondansetron Injectable 4 milliGRAM(s) IV Push every 8 hours PRN    
                          Patient: TERESA LOMBARDO [id number] 89y Female                            Hospitalist Attending Note    Son at bedside reports pt's mental status much improved, near baseline.  No weakness / numbness.    She was unable to tolerate MRI.  Son agrees with cessation of MRI.    ____________________PHYSICAL EXAM:  GENERAL:  NAD, Alert, oriented to person, place "hospital", time "2024".    HEENT: NCAT  CARDIOVASCULAR:  S1, S2  LUNGS: CTAB  ABDOMEN:  soft, (-) tenderness, (-) distension, (+) bowel sounds, (-) guarding, (-) rebound (-) rigidity  EXTREMITIES:  no cyanosis / clubbing / edema.   NEURO: strength symmetric, sensation grossly intact.   ____________________          VITALS:  Vital Signs Last 24 Hrs  T(C): 36.4 (08 Jul 2024 15:56), Max: 36.8 (07 Jul 2024 23:29)  T(F): 97.6 (08 Jul 2024 15:56), Max: 98.3 (07 Jul 2024 23:29)  HR: 79 (08 Jul 2024 15:56) (71 - 91)  BP: 136/63 (08 Jul 2024 15:56) (127/66 - 150/69)  BP(mean): --  RR: 18 (08 Jul 2024 15:56) (18 - 20)  SpO2: 96% (08 Jul 2024 15:56) (93% - 96%)    Parameters below as of 08 Jul 2024 15:56  Patient On (Oxygen Delivery Method): room air     Daily     Daily   CAPILLARY BLOOD GLUCOSE        I&O's Summary    07 Jul 2024 07:01  -  08 Jul 2024 07:00  --------------------------------------------------------  IN: 0 mL / OUT: 2300 mL / NET: -2300 mL    08 Jul 2024 07:01  -  08 Jul 2024 19:17  --------------------------------------------------------  IN: 0 mL / OUT: 500 mL / NET: -500 mL        LABS:                        12.1   11.16 )-----------( 316      ( 08 Jul 2024 06:55 )             36.5     07-08    141  |  107  |  11  ----------------------------<  168<H>  3.7   |  29  |  0.76    Ca    8.9      08 Jul 2024 06:55          Urinalysis Basic - ( 08 Jul 2024 06:55 )    Color: x / Appearance: x / SG: x / pH: x  Gluc: 168 mg/dL / Ketone: x  / Bili: x / Urobili: x   Blood: x / Protein: x / Nitrite: x   Leuk Esterase: x / RBC: x / WBC x   Sq Epi: x / Non Sq Epi: x / Bacteria: x              MEDICATIONS:  acetaminophen     Tablet .. 650 milliGRAM(s) Oral every 6 hours PRN  aluminum hydroxide/magnesium hydroxide/simethicone Suspension 30 milliLiter(s) Oral every 4 hours PRN  atenolol  Tablet 100 milliGRAM(s) Oral daily  atorvastatin 20 milliGRAM(s) Oral at bedtime  diazepam    Tablet 2 milliGRAM(s) Oral at bedtime  escitalopram 10 milliGRAM(s) Oral daily  heparin   Injectable 5000 Unit(s) SubCutaneous every 12 hours  hydrALAZINE 10 milliGRAM(s) Oral three times a day  hydrochlorothiazide 12.5 milliGRAM(s) Oral daily  lactated ringers. 1000 milliLiter(s) IV Continuous <Continuous>  losartan 50 milliGRAM(s) Oral daily  melatonin 3 milliGRAM(s) Oral at bedtime PRN  NIFEdipine XL 60 milliGRAM(s) Oral daily  OLANZapine 2.5 milliGRAM(s) Oral at bedtime  ondansetron Injectable 4 milliGRAM(s) IV Push every 8 hours PRN    
                          Patient: TERESA LOMBARDO [id number] 89y Female                            Hospitalist Attending Note    Pt more alert.  Per RN, was agitated overnight.  Denies specific complaints.  Feeding self.    ____________________PHYSICAL EXAM:  GENERAL:  NAD, Alert, oriented to person, place "hospital", time "2024".    HEENT: NCAT  CARDIOVASCULAR:  S1, S2  LUNGS: CTAB  ABDOMEN:  soft, (-) tenderness, (-) distension, (+) bowel sounds, (-) guarding, (-) rebound (-) rigidity  EXTREMITIES:  no cyanosis / clubbing / edema.   NEURO: strength symmetric, sensation grossly intact.   ____________________      VITALS:  Vital Signs Last 24 Hrs  T(C): 36.5 (07 Jul 2024 10:11), Max: 37.2 (07 Jul 2024 05:30)  T(F): 97.7 (07 Jul 2024 10:11), Max: 99 (07 Jul 2024 05:30)  HR: 82 (07 Jul 2024 10:11) (79 - 94)  BP: 154/67 (07 Jul 2024 10:11) (94/64 - 154/67)  BP(mean): --  RR: 18 (07 Jul 2024 10:11) (18 - 18)  SpO2: 95% (07 Jul 2024 10:11) (94% - 98%)    Parameters below as of 07 Jul 2024 10:11  Patient On (Oxygen Delivery Method): room air     Daily     Daily   CAPILLARY BLOOD GLUCOSE        I&O's Summary    06 Jul 2024 07:01  -  07 Jul 2024 07:00  --------------------------------------------------------  IN: 0 mL / OUT: 1000 mL / NET: -1000 mL    07 Jul 2024 07:01  -  07 Jul 2024 13:01  --------------------------------------------------------  IN: 0 mL / OUT: 700 mL / NET: -700 mL        LABS:                        12.7   11.66 )-----------( 334      ( 07 Jul 2024 07:10 )             38.3     07-07    138  |  105  |  12  ----------------------------<  170<H>  3.3<L>   |  22  |  0.76    Ca    9.1      07 Jul 2024 07:10          Urinalysis Basic - ( 07 Jul 2024 07:10 )    Color: x / Appearance: x / SG: x / pH: x  Gluc: 170 mg/dL / Ketone: x  / Bili: x / Urobili: x   Blood: x / Protein: x / Nitrite: x   Leuk Esterase: x / RBC: x / WBC x   Sq Epi: x / Non Sq Epi: x / Bacteria: x            Culture - Urine (collected 04 Jul 2024 22:50)  Source: Clean Catch Clean Catch (Midstream)  Final Report (06 Jul 2024 06:37):    <10,000 CFU/mL Normal Urogenital Tracie    Culture - Blood (collected 04 Jul 2024 21:10)  Source: .Blood Blood-Peripheral  Preliminary Report (07 Jul 2024 02:02):    No growth at 48 Hours    Culture - Blood (collected 04 Jul 2024 21:00)  Source: .Blood Blood-Peripheral  Preliminary Report (07 Jul 2024 02:02):    No growth at 48 Hours        MEDICATIONS:  acetaminophen     Tablet .. 650 milliGRAM(s) Oral every 6 hours PRN  aluminum hydroxide/magnesium hydroxide/simethicone Suspension 30 milliLiter(s) Oral every 4 hours PRN  atenolol  Tablet 100 milliGRAM(s) Oral daily  atorvastatin 20 milliGRAM(s) Oral at bedtime  cefTRIAXone   IVPB 1000 milliGRAM(s) IV Intermittent every 24 hours  diazepam    Tablet 2 milliGRAM(s) Oral at bedtime  escitalopram 10 milliGRAM(s) Oral daily  heparin   Injectable 5000 Unit(s) SubCutaneous every 12 hours  hydrALAZINE 10 milliGRAM(s) Oral three times a day  hydrochlorothiazide 12.5 milliGRAM(s) Oral daily  lactated ringers. 1000 milliLiter(s) IV Continuous <Continuous>  LORazepam   Injectable 1 milliGRAM(s) IV Push once  losartan 50 milliGRAM(s) Oral daily  melatonin 3 milliGRAM(s) Oral at bedtime PRN  NIFEdipine XL 60 milliGRAM(s) Oral daily  ondansetron Injectable 4 milliGRAM(s) IV Push every 8 hours PRN  potassium chloride    Tablet ER 40 milliEquivalent(s) Oral once    
                          Patient: TERESA LOMBARDO [id number] 89y Female                            Hospitalist Attending Note    Son Wes at bedside.  Reports pt remains confused.  Pt denies complaints.     ____________________PHYSICAL EXAM:  GENERAL:  NAD, arousable, confused, oriented to person.   HEENT: NCAT  CARDIOVASCULAR:  S1, S2  LUNGS: CTAB  ABDOMEN:  soft, (-) tenderness, (-) distension, (+) bowel sounds, (-) guarding, (-) rebound (-) rigidity  EXTREMITIES:  no cyanosis / clubbing / edema.   ____________________     VITALS:  Vital Signs Last 24 Hrs  T(C): 36.3 (06 Jul 2024 15:29), Max: 36.9 (05 Jul 2024 23:40)  T(F): 97.4 (06 Jul 2024 15:29), Max: 98.4 (05 Jul 2024 23:40)  HR: 94 (06 Jul 2024 17:36) (75 - 102)  BP: 147/61 (06 Jul 2024 17:36) (94/64 - 147/61)  BP(mean): --  RR: 18 (06 Jul 2024 17:36) (18 - 19)  SpO2: 96% (06 Jul 2024 17:36) (94% - 96%)    Parameters below as of 06 Jul 2024 17:36  Patient On (Oxygen Delivery Method): room air     Daily     Daily   CAPILLARY BLOOD GLUCOSE        I&O's Summary    05 Jul 2024 07:01  -  06 Jul 2024 07:00  --------------------------------------------------------  IN: 0 mL / OUT: 1350 mL / NET: -1350 mL    06 Jul 2024 07:01  -  06 Jul 2024 17:58  --------------------------------------------------------  IN: 0 mL / OUT: 1000 mL / NET: -1000 mL        HISTORY:  PAST MEDICAL & SURGICAL HISTORY:  HTN (hypertension)      OCD (obsessive compulsive disorder)      Anxiety      HLD (hyperlipidemia)      Allergies    penicillins (Unknown)  Tolerated ceftriaxone 7/2024 (Other)    Intolerances       LABS:                        12.3   11.84 )-----------( 313      ( 05 Jul 2024 07:15 )             38.1       Culture - Urine (collected 04 Jul 2024 22:50)  Source: Clean Catch Clean Catch (Midstream)  Final Report (06 Jul 2024 06:37):    <10,000 CFU/mL Normal Urogenital Tracie    Culture - Blood (collected 04 Jul 2024 21:10)  Source: .Blood Blood-Peripheral  Preliminary Report (06 Jul 2024 02:03):    No growth at 24 hours    Culture - Blood (collected 04 Jul 2024 21:00)  Source: .Blood Blood-Peripheral  Preliminary Report (06 Jul 2024 02:03):    No growth at 24 hours    07-05    139  |  104  |  14  ----------------------------<  141<H>  2.9<LL>   |  27  |  0.83    Ca    9.4      05 Jul 2024 07:15  Phos  3.3     07-05    TPro  6.2  /  Alb  3.5  /  TBili  1.0  /  DBili  x   /  AST  36  /  ALT  25  /  AlkPhos  58  07-05    PT/INR - ( 04 Jul 2024 22:10 )   PT: 11.5 sec;   INR: 0.97 ratio         PTT - ( 04 Jul 2024 22:10 )  PTT:26.4 sec  LIVER FUNCTIONS - ( 05 Jul 2024 07:15 )  Alb: 3.5 g/dL / Pro: 6.2 gm/dL / ALK PHOS: 58 U/L / ALT: 25 U/L / AST: 36 U/L / GGT: x           Urinalysis Basic - ( 05 Jul 2024 07:15 )    Color: x / Appearance: x / SG: x / pH: x  Gluc: 141 mg/dL / Ketone: x  / Bili: x / Urobili: x   Blood: x / Protein: x / Nitrite: x   Leuk Esterase: x / RBC: x / WBC x   Sq Epi: x / Non Sq Epi: x / Bacteria: x      CARDIAC MARKERS ( 04 Jul 2024 21:10 )  x     / x     / 654 U/L / x     / x          Culture - Urine (collected 04 Jul 2024 22:50)  Source: Clean Catch Clean Catch (Midstream)  Final Report (06 Jul 2024 06:37):    <10,000 CFU/mL Normal Urogenital Tracie    Culture - Blood (collected 04 Jul 2024 21:10)  Source: .Blood Blood-Peripheral  Preliminary Report (06 Jul 2024 02:03):    No growth at 24 hours    Culture - Blood (collected 04 Jul 2024 21:00)  Source: .Blood Blood-Peripheral  Preliminary Report (06 Jul 2024 02:03):    No growth at 24 hours          MEDICATIONS:  MEDICATIONS  (STANDING):  atenolol  Tablet 100 milliGRAM(s) Oral daily  atorvastatin 20 milliGRAM(s) Oral at bedtime  cefTRIAXone   IVPB 1000 milliGRAM(s) IV Intermittent every 24 hours  diazepam    Tablet 2 milliGRAM(s) Oral at bedtime  escitalopram 10 milliGRAM(s) Oral daily  heparin   Injectable 5000 Unit(s) SubCutaneous every 12 hours  hydrALAZINE 10 milliGRAM(s) Oral three times a day  hydrochlorothiazide 12.5 milliGRAM(s) Oral daily  lactated ringers. 1000 milliLiter(s) (125 mL/Hr) IV Continuous <Continuous>  losartan 50 milliGRAM(s) Oral daily  NIFEdipine XL 60 milliGRAM(s) Oral daily  potassium chloride  10 mEq/100 mL IVPB 10 milliEquivalent(s) IV Intermittent every 1 hour    MEDICATIONS  (PRN):  acetaminophen     Tablet .. 650 milliGRAM(s) Oral every 6 hours PRN Temp greater or equal to 38C (100.4F), Mild Pain (1 - 3)  aluminum hydroxide/magnesium hydroxide/simethicone Suspension 30 milliLiter(s) Oral every 4 hours PRN Dyspepsia  melatonin 3 milliGRAM(s) Oral at bedtime PRN Insomnia  ondansetron Injectable 4 milliGRAM(s) IV Push every 8 hours PRN Nausea and/or Vomiting  
HPI:  89F w/ PMHx of HTN, HLD, Anxiety, OCD presents with c/o fall x2. Patient poor historian, history as per chart review. Patient was found on floor yesterday morning by son but was able to be helped up and ambulating and eating breakfast, then son came home and found pt on floor again and more lethargic/confused than usual. No recent known illness, vomiting, or cough or sick contacts. Pt typically very independent. In ED patient with Tmax 101.2, vitals otherwise stable. CTH neg for intracranial pathology. Admitted for UTI.  (05 Jul 2024 01:07)  Patient is a 89y old  Female who presents with a chief complaint of     INTERVAL HPI/OVERNIGHT EVENTS: no acute events     MEDICATIONS  (STANDING):  atenolol  Tablet 100 milliGRAM(s) Oral daily  atorvastatin 20 milliGRAM(s) Oral at bedtime  cefTRIAXone   IVPB 1000 milliGRAM(s) IV Intermittent every 24 hours  diazepam    Tablet 2 milliGRAM(s) Oral at bedtime  escitalopram 10 milliGRAM(s) Oral daily  heparin   Injectable 5000 Unit(s) SubCutaneous every 12 hours  hydrALAZINE 10 milliGRAM(s) Oral three times a day  hydrochlorothiazide 12.5 milliGRAM(s) Oral daily  lactated ringers. 1000 milliLiter(s) (100 mL/Hr) IV Continuous <Continuous>  losartan 50 milliGRAM(s) Oral daily  NIFEdipine XL 60 milliGRAM(s) Oral daily    MEDICATIONS  (PRN):  acetaminophen     Tablet .. 650 milliGRAM(s) Oral every 6 hours PRN Temp greater or equal to 38C (100.4F), Mild Pain (1 - 3)  aluminum hydroxide/magnesium hydroxide/simethicone Suspension 30 milliLiter(s) Oral every 4 hours PRN Dyspepsia  melatonin 3 milliGRAM(s) Oral at bedtime PRN Insomnia  ondansetron Injectable 4 milliGRAM(s) IV Push every 8 hours PRN Nausea and/or Vomiting      Allergies    penicillins (Unknown)  Tolerated ceftriaxone 7/2024 (Other)    Intolerances        REVIEW OF SYSTEMS:  unreliable given metabolic encephalopathy     Vital Signs Last 24 Hrs  T(C): 36.4 (05 Jul 2024 11:58), Max: 38.4 (04 Jul 2024 20:47)  T(F): 97.5 (05 Jul 2024 11:58), Max: 101.2 (04 Jul 2024 20:47)  HR: 76 (05 Jul 2024 11:58) (64 - 79)  BP: 130/63 (05 Jul 2024 11:58) (116/59 - 152/53)  BP(mean): 81 (04 Jul 2024 21:16) (81 - 81)  RR: 20 (05 Jul 2024 11:58) (17 - 25)  SpO2: 94% (05 Jul 2024 11:58) (93% - 97%)    Parameters below as of 05 Jul 2024 11:58  Patient On (Oxygen Delivery Method): room air        PHYSICAL EXAM:  GENERAL: NAD, well-groomed, well-developed  HEAD:  Atraumatic, Normocephalic  EYES: EOMI, PERRLA, conjunctiva and sclera clear  ENMT: No tonsillar erythema, exudates, or enlargement; Moist mucous membranes, Good dentition, No lesions  NECK: Supple, No JVD, Normal thyroid  NERVOUS SYSTEM:  Alert & Oriented X3, Good concentration; Motor Strength 5/5 B/L upper and lower extremities; DTRs 2+ intact and symmetric  CHEST/LUNG: Clear to ascultation  bilaterally; No rales, rhonchi, wheezing, or rubs  HEART: Regular rate and rhythm; No murmurs, rubs, or gallops  ABDOMEN: Soft, Nontender, Nondistended; Bowel sounds present  EXTREMITIES:  2+ Peripheral Pulses, No clubbing, cyanosis, or edema  LYMPH: No lymphadenopathy noted  SKIN: No rashes or lesions    LABS:                        12.3   11.84 )-----------( 313      ( 05 Jul 2024 07:15 )             38.1     07-05    139  |  104  |  14  ----------------------------<  141<H>  2.9<LL>   |  27  |  0.83    Ca    9.4      05 Jul 2024 07:15  Phos  3.3     07-05    TPro  6.2  /  Alb  3.5  /  TBili  1.0  /  DBili  x   /  AST  36  /  ALT  25  /  AlkPhos  58  07-05    PT/INR - ( 04 Jul 2024 22:10 )   PT: 11.5 sec;   INR: 0.97 ratio         PTT - ( 04 Jul 2024 22:10 )  PTT:26.4 sec  Urinalysis Basic - ( 05 Jul 2024 07:15 )    Color: x / Appearance: x / SG: x / pH: x  Gluc: 141 mg/dL / Ketone: x  / Bili: x / Urobili: x   Blood: x / Protein: x / Nitrite: x   Leuk Esterase: x / RBC: x / WBC x   Sq Epi: x / Non Sq Epi: x / Bacteria: x      CAPILLARY BLOOD GLUCOSE      POCT Blood Glucose.: 155 mg/dL (04 Jul 2024 20:49)      RADIOLOGY & ADDITIONAL TESTS:    Imaging Personally Reviewed:  [ X] YES  [ ] NO    Consultant(s) Notes Reviewed:  [ X] YES  [ ] NO    Care Discussed with Consultants/Other Providers [ X] YES  [ ] NO

## 2024-07-09 NOTE — DISCHARGE NOTE PROVIDER - NSDCCPCAREPLAN_GEN_ALL_CORE_FT
PRINCIPAL DISCHARGE DIAGNOSIS  Diagnosis: Acute metabolic encephalopathy  Assessment and Plan of Treatment:       SECONDARY DISCHARGE DIAGNOSES  Diagnosis: HLD (hyperlipidemia)  Assessment and Plan of Treatment:     Diagnosis: Anxiety  Assessment and Plan of Treatment:     Diagnosis: HTN (hypertension)  Assessment and Plan of Treatment:     Diagnosis: Acute UTI  Assessment and Plan of Treatment:

## 2024-07-09 NOTE — DISCHARGE NOTE PROVIDER - NSDCMRMEDTOKEN_GEN_ALL_CORE_FT
atenolol 100 mg oral tablet: 1 tab(s) orally once a day  atorvastatin 20 mg oral tablet: 1 tab(s) orally once a day (at bedtime)  escitalopram 10 mg oral tablet: 1 tab(s) orally once a day  LOSARTAN POTASSIUM/HYDROCHLOROTHIAZ KARLA 50-12.5 MG TABS:   NIFEdipine 60 mg oral tablet, extended release: 1 tab(s) orally once a day

## 2024-07-09 NOTE — DISCHARGE NOTE NURSING/CASE MANAGEMENT/SOCIAL WORK - PATIENT PORTAL LINK FT
You can access the FollowMyHealth Patient Portal offered by [facility] by registering at the following website: [email or web address]. By joining [facility]’s FollowMyHealth portal, you will also be able to view your health information using other applications (apps) compatible with our system.

## 2024-07-09 NOTE — DISCHARGE NOTE NURSING/CASE MANAGEMENT/SOCIAL WORK - NURSING SECTION COMPLETE
no abdominal pain, no nausea and no vomiting.
Patient/Caregiver provided printed discharge information.

## 2024-07-09 NOTE — DISCHARGE NOTE PROVIDER - NSDCFUADDINST_GEN_ALL_CORE_FT
Ensure 1 can oral twice daily.    It is important to see your primary physician as well as any specialty physicians within the next week to perform a comprehensive medical review.  Call their offices for an appointment as soon as you leave the hospital.  You will also need to see them for renewal of your medications.  If have any difficulty following with a physician, contact the [facility] [phone] or via [email or web address].   To obtain your results, you can access the [email or web address] Patient Portal at [email or web address].  Your medical issues appear to be stable at this time, but if your symptoms recur or worsen, contact your physicians and/or return to the hospital if necessary.  If you encounter any issues or questions with your medication, call your physicians before stopping the medication.  Do not drive.  Limit your diet to 2 grams of sodium daily.

## 2024-07-09 NOTE — PROGRESS NOTE ADULT - ASSESSMENT
89F w/ PMHx of HTN, HLD, Anxiety, OCD presents with c/o fall x2.      Problem/Plan - 1:  ·  Problem: Acute UTI.   ·  Plan: C/w ceftriaxone  F/u cultures  PT eval for falls.    # Acute Metabolic Encephalopathy - ? due to UTI.  Pt's son states pt lives independently, drives at baseline.  Mental status change was acute, rapid.  CT head unremarkable.  Will check MRI to r/o CVA.         Problem/Plan - 2:  ·  Problem: HTN (hypertension).   ·  Plan: C/w home medications  Monitor BP and adjust meds as needed to achieve target BP.     Problem/Plan - 3:  ·  Problem: HLD (hyperlipidemia).   ·  Plan: C/w statin.     Problem/Plan - 4:  ·  Problem: Anxiety.   ·  Plan: C/w diazepam to prevent withdrawal.     Problem/Plan - 5:  ·  Problem: Prophylactic measure.   ·  Plan: DVT ppx: HepsubQ    D/w son Wes 7/6.   
89F w/ PMHx of HTN, HLD, Anxiety, OCD presents with c/o fall x2.     # Acute UTI.  ·  Plan: C/w ceftriaxone F/u cultures PT eval for falls appreciated, recommending AL.    # Acute Metabolic Encephalopathy - Due to UTI, lingering effect of Ativan / Zyprexa.  CT head unremarkable.  Clinical picture favors metabolic encephalopathy over CVA, as symptoms have significantly improved.  Pt unable to tolerate MRI despite sedation.  Given the risks, benefits and alternatives, son agrees with d/c of MRI.  # Essential HTN - Monitor BP.  Continue antihypertensives.  # Hyperlipidemia - diet modification.  Continue Statin.  # Anxiety / OCD - Hold diazepam.  Can be restarted if needed.    # Inpatient DVT prophylaxis - subcutaneous Heparin     Stable for AL.  d/w son Wes in agreement.  
89F w/ PMHx of HTN, HLD, Anxiety, OCD presents with c/o fall x2.     # Acute UTI.  ·  Plan: C/w ceftriaxone F/u cultures PT eval for falls appreciated, recommending AL.    # Acute Metabolic Encephalopathy - Due to UTI.  CT head unremarkable.  Clinical picture favors metabolic encephalopathy over CVA, as symptoms have significantly improved.  Pt unable to tolerate MRI despite sedation.  Given the risks, benefits and alternatives, son agrees with d/c of MRI.  # Essential HTN - Monitor BP.  Continue antihypertensives.  # Hyperlipidemia - diet modification.  Continue Statin.  # Anxiety / OCD - continue diazepam.  Trial of Zyprexa at night.  # Inpatient DVT prophylaxis - subcutaneous Heparin     Plan AL.  d/w son Wse in agreement.  
89F w/ PMHx of HTN, HLD, Anxiety, OCD presents with c/o fall x2.     # Acute UTI.  ·  Plan: C/w ceftriaxone F/u cultures PT eval for falls.  # Acute Metabolic Encephalopathy - ? due to UTI.  Pt's son states pt lives independently, drives at baseline.  Mental status change was acute, rapid.  CT head unremarkable.  Will check MRI to r/o CVA.    # Essential HTN - Monitor BP.  Continue antihypertensives.  # Hyperlipidemia - diet modification.  Continue Statin.  # Anxiety / OCD - continue diazepam.  Trial of Zyprexa at night.  # Inpatient DVT prophylaxis - subcutaneous Heparin   
89F admitted for UTI.

## 2024-07-09 NOTE — DISCHARGE NOTE PROVIDER - HOSPITAL COURSE
89F w/ PMHx of HTN, HLD, Anxiety, OCD presents with c/o fall x2.     # Acute UTI.  ·  Plan: C/w ceftriaxone F/u cultures PT eval for falls appreciated, recommending AL.    # Acute Metabolic Encephalopathy - Due to UTI, lingering effect of Ativan / Zyprexa.  CT head unremarkable.  Clinical picture favors metabolic encephalopathy over CVA, as symptoms have significantly improved.  Pt unable to tolerate MRI despite sedation.  Given the risks, benefits and alternatives, son agrees with d/c of MRI.  # Essential HTN - Monitor BP.  Continue antihypertensives.  # Hyperlipidemia - diet modification.  Continue Statin.  # Anxiety / OCD - Hold diazepam.  Can be restarted if needed.    # Inpatient DVT prophylaxis - subcutaneous Heparin     Stable for AL.  d/w son Wes in agreement.    Disposition: Stable for discharge.  Outpatient followup discussed.  Total time spent on discharge is  35  minutes.

## 2024-07-10 LAB
CULTURE RESULTS: SIGNIFICANT CHANGE UP
CULTURE RESULTS: SIGNIFICANT CHANGE UP
SPECIMEN SOURCE: SIGNIFICANT CHANGE UP
SPECIMEN SOURCE: SIGNIFICANT CHANGE UP

## 2024-07-18 DIAGNOSIS — Z88.0 ALLERGY STATUS TO PENICILLIN: ICD-10-CM

## 2024-07-18 DIAGNOSIS — N39.0 URINARY TRACT INFECTION, SITE NOT SPECIFIED: ICD-10-CM

## 2024-07-18 DIAGNOSIS — F41.9 ANXIETY DISORDER, UNSPECIFIED: ICD-10-CM

## 2024-07-18 DIAGNOSIS — I10 ESSENTIAL (PRIMARY) HYPERTENSION: ICD-10-CM

## 2024-07-18 DIAGNOSIS — F42.9 OBSESSIVE-COMPULSIVE DISORDER, UNSPECIFIED: ICD-10-CM

## 2024-07-18 DIAGNOSIS — G93.41 METABOLIC ENCEPHALOPATHY: ICD-10-CM

## 2024-07-18 DIAGNOSIS — E78.5 HYPERLIPIDEMIA, UNSPECIFIED: ICD-10-CM

## 2024-07-18 DIAGNOSIS — A41.9 SEPSIS, UNSPECIFIED ORGANISM: ICD-10-CM

## 2025-03-10 NOTE — PATIENT PROFILE ADULT - LOCATION #5
Problem: Patient/Family Goals  Goal: Patient/Family Short Term Goal  Description: Patient's Short Term Goal: Fell better    Interventions  - See additional Care Plan goals for specific interventions  Outcome: Adequate for Discharge      Right heel